# Patient Record
Sex: FEMALE | NOT HISPANIC OR LATINO
[De-identification: names, ages, dates, MRNs, and addresses within clinical notes are randomized per-mention and may not be internally consistent; named-entity substitution may affect disease eponyms.]

---

## 2023-07-24 PROBLEM — Z00.00 ENCOUNTER FOR PREVENTIVE HEALTH EXAMINATION: Status: ACTIVE | Noted: 2023-07-24

## 2023-07-25 ENCOUNTER — APPOINTMENT (OUTPATIENT)
Dept: OTOLARYNGOLOGY | Facility: CLINIC | Age: 28
End: 2023-07-25
Payer: COMMERCIAL

## 2023-07-25 VITALS — HEIGHT: 68 IN | BODY MASS INDEX: 20.46 KG/M2 | WEIGHT: 135 LBS

## 2023-07-25 DIAGNOSIS — Z87.09 PERSONAL HISTORY OF OTHER DISEASES OF THE RESPIRATORY SYSTEM: ICD-10-CM

## 2023-07-25 DIAGNOSIS — H02.849 EDEMA OF UNSPECIFIED EYE, UNSPECIFIED EYELID: ICD-10-CM

## 2023-07-25 DIAGNOSIS — Z78.9 OTHER SPECIFIED HEALTH STATUS: ICD-10-CM

## 2023-07-25 DIAGNOSIS — Z80.9 FAMILY HISTORY OF MALIGNANT NEOPLASM, UNSPECIFIED: ICD-10-CM

## 2023-07-25 DIAGNOSIS — J34.2 DEVIATED NASAL SEPTUM: ICD-10-CM

## 2023-07-25 DIAGNOSIS — Z91.09 OTHER ALLERGY STATUS, OTHER THAN TO DRUGS AND BIOLOGICAL SUBSTANCES: ICD-10-CM

## 2023-07-25 DIAGNOSIS — J31.0 CHRONIC RHINITIS: ICD-10-CM

## 2023-07-25 DIAGNOSIS — Z83.3 FAMILY HISTORY OF DIABETES MELLITUS: ICD-10-CM

## 2023-07-25 PROCEDURE — 99203 OFFICE O/P NEW LOW 30 MIN: CPT | Mod: 25

## 2023-07-25 PROCEDURE — 31231 NASAL ENDOSCOPY DX: CPT

## 2023-07-25 RX ORDER — MAGNESIUM OXIDE/MAG AA CHELATE 300 MG
CAPSULE ORAL
Refills: 0 | Status: ACTIVE | COMMUNITY

## 2023-07-25 NOTE — REASON FOR VISIT
[Initial Evaluation] : an initial evaluation for [Nasal Obstruction] : nasal obstruction [FreeTextEntry2] : eye swelling

## 2023-07-25 NOTE — ASSESSMENT
[FreeTextEntry1] : She has a history of chronic rhinitis and allergies.  She has had a form of history of intermittent eyelid swelling.  On nasal endoscopy, she had mild is mucosal edema and a deviated septum.  There is no congestion or purulent drainage.  The etiology of the eyelid swelling is unclear.  She has seen a dermatologist and eye doctor.  Those exams were reportedly normal.  We discussed the possibility of allergies and autoimmune disease as well as sinus disease\par \par Plan\par -Findings and management options were discussed with the patient.\par -Nasal saline irrigations, nasal steroid spray, and antihistamine as needed\par -I recommended repeat allergy evaluation\par -Allergy precautions\par -I also recommended rheumatology evaluation\par -- I am sending her for CT scan of the sinuses\par -I will see her back after the CT scan\par -I have asked her to call and return earlier if she has any concerns or worsening symptoms

## 2023-07-25 NOTE — HISTORY OF PRESENT ILLNESS
[de-identified] : REBEKAH CARRERO is a 27 year old patient with a history of chronic rhinitis and allergies here for a 4-month history of intermittent bilateral eyelid swelling.  It occurs randomly.  It has been getting more frequent.  She sometimes has pain and slight redness when it occurs.  She has not been using any new make-up or creams.  She has seen her eye doctor and a dermatologist.  Nothing has been found on exam.  She does have constant nasal congestion obstruction bilaterally.  She has no sinus pain or pressure\par \par She has a history of allergies.  She had testing 1 year ago.\par She has had 2 sinus infections this past year but typically does not have recurrent infections\par She had a closed reduction of a nasal fracture in 2007\par She has a dog at home and does have an allergy to dogs\par She does not smoke\par She uses Nasacort and an antihistamine as needed